# Patient Record
Sex: FEMALE | Race: WHITE | NOT HISPANIC OR LATINO | Employment: FULL TIME | URBAN - METROPOLITAN AREA
[De-identification: names, ages, dates, MRNs, and addresses within clinical notes are randomized per-mention and may not be internally consistent; named-entity substitution may affect disease eponyms.]

---

## 2017-01-05 ENCOUNTER — OFFICE VISIT (OUTPATIENT)
Dept: OPHTHALMOLOGY | Facility: CLINIC | Age: 57
End: 2017-01-05
Payer: COMMERCIAL

## 2017-01-05 DIAGNOSIS — H25.13 NUCLEAR SCLEROSIS, BILATERAL: ICD-10-CM

## 2017-01-05 DIAGNOSIS — H40.013 OPEN ANGLE WITH BORDERLINE FINDINGS, LOW RISK, BILATERAL: Primary | ICD-10-CM

## 2017-01-05 DIAGNOSIS — H52.7 REFRACTIVE ERROR: ICD-10-CM

## 2017-01-05 PROCEDURE — 92250 FUNDUS PHOTOGRAPHY W/I&R: CPT | Mod: S$GLB,,, | Performed by: OPHTHALMOLOGY

## 2017-01-05 PROCEDURE — 92014 COMPRE OPH EXAM EST PT 1/>: CPT | Mod: S$GLB,,, | Performed by: OPHTHALMOLOGY

## 2017-01-05 PROCEDURE — 99999 PR PBB SHADOW E&M-EST. PATIENT-LVL II: CPT | Mod: PBBFAC,,, | Performed by: OPHTHALMOLOGY

## 2017-01-05 PROCEDURE — 92083 EXTENDED VISUAL FIELD XM: CPT | Mod: S$GLB,,, | Performed by: OPHTHALMOLOGY

## 2017-01-05 NOTE — PROGRESS NOTES
SUBJECTIVE:   Danitza Santos is a 56 y.o. female   Corrected distance visual acuity was 20/25 -1 in the right eye and 20/30 +2 in the left eye.   Chief Complaint   Patient presents with    Glaucoma Suspect     1 year HVF/FD, no drops        HPI:  HPI     Glaucoma Suspect    Additional comments: 1 year HVF/FD, no drops           Comments   Pt states she's been noticing a slight decline in her vision when driving   at night. Just started recently, she doesn't drive much at night. Not   using any drops, no ocular pain or irritation. Distance vision is still   about the same, would like to update her glasses, she lost her last pair.    1. COAG Susp + Fhx  2. Blepharitis  3. Dry Eyes OU       Last edited by Rob Yu on 1/5/2017  9:01 AM. (History)        Assessment /Plan :  1. Open angle with borderline findings, low risk, bilateral No evidence of glaucoma at this time but based on risk factors recommend to continue monitoring.   2. Nuclear sclerosis, bilateral  - not visually significant. Observe.   3. Refractive error PAL Rx     Return to clinic in 1 year  or as needed.  With 24-2 HVF, Dilation and SDP's

## 2017-01-05 NOTE — MR AVS SNAPSHOT
[facility] - Ophthalmology  [address] [place] [address]  Phone: [phone]  Fax: [phone]                  Danitza Santos   2017 8:45 AM   Office Visit    Description:  Female : 1960   Provider:  Colin Estevez MD   Department:  [facility] - Ophthalmology           Reason for Visit     Glaucoma Suspect           Diagnoses this Visit        Comments    Open angle with borderline findings, low risk, bilateral    -  Primary     Nuclear sclerosis, bilateral         Refractive error                To Do List           Goals (5 Years of Data)     None      Follow-Up and Disposition     Return in about 1 year (around 2018).      [facility] On Call     [facility] On Call Nurse Care Line -  Assistance  Registered nurses in the [facility] On Call Center provide clinical advisement, health education, appointment booking, and other advisory services.  Call for this free service at [phone].             Medications           Message regarding Medications     Verify the changes and/or additions to your medication regime listed below are the same as discussed with your clinician today.  If any of these changes or additions are incorrect, please notify your healthcare provider.             Verify that the below list of medications is an accurate representation of the medications you are currently taking.  If none reported, the list may be blank. If incorrect, please contact your healthcare provider. Carry this list with you in case of emergency.           Current Medications     albuterol 90 mcg/actuation inhaler Inhale 1-2 puffs into the lungs every 6 (six) hours as needed for Wheezing.           Clinical Reference Information           Allergies as of 2017     No Known Allergies      Immunizations Administered on Date of Encounter - 2017     None      Orders Placed During Today's Visit      Normal Orders This Visit    Color Fundus Photography - OU - Both Eyes     [facility] Visual  Field - OU - Extended - Both Eyes

## 2017-01-19 ENCOUNTER — CLINICAL SUPPORT (OUTPATIENT)
Dept: SMOKING CESSATION | Facility: CLINIC | Age: 57
End: 2017-01-19
Payer: COMMERCIAL

## 2017-01-19 ENCOUNTER — TELEPHONE (OUTPATIENT)
Dept: SMOKING CESSATION | Facility: CLINIC | Age: 57
End: 2017-01-19

## 2017-01-19 DIAGNOSIS — F17.200 NICOTINE DEPENDENCE: Primary | ICD-10-CM

## 2017-01-19 PROCEDURE — 99407 BEHAV CHNG SMOKING > 10 MIN: CPT | Mod: S$GLB,,, | Performed by: GENERAL PRACTICE

## 2017-08-31 ENCOUNTER — CLINICAL SUPPORT (OUTPATIENT)
Dept: SMOKING CESSATION | Facility: CLINIC | Age: 57
End: 2017-08-31
Payer: COMMERCIAL

## 2017-08-31 DIAGNOSIS — F17.200 NICOTINE DEPENDENCE: Primary | ICD-10-CM

## 2017-08-31 PROCEDURE — 99407 BEHAV CHNG SMOKING > 10 MIN: CPT | Mod: S$GLB,,, | Performed by: INTERNAL MEDICINE

## 2017-09-13 ENCOUNTER — CLINICAL SUPPORT (OUTPATIENT)
Dept: SMOKING CESSATION | Facility: CLINIC | Age: 57
End: 2017-09-13
Payer: COMMERCIAL

## 2017-09-13 DIAGNOSIS — F17.200 NICOTINE DEPENDENCE: Primary | ICD-10-CM

## 2017-09-13 PROCEDURE — 99404 PREV MED CNSL INDIV APPRX 60: CPT | Mod: S$GLB,,,

## 2017-09-13 PROCEDURE — 99999 PR PBB SHADOW E&M-EST. PATIENT-LVL I: CPT | Mod: PBBFAC,,,

## 2017-09-13 RX ORDER — VARENICLINE TARTRATE 0.5 (11)-1
KIT ORAL
Qty: 1 PACKAGE | Refills: 0 | Status: SHIPPED | OUTPATIENT
Start: 2017-09-13 | End: 2017-11-14 | Stop reason: ALTCHOICE

## 2017-09-20 ENCOUNTER — CLINICAL SUPPORT (OUTPATIENT)
Dept: SMOKING CESSATION | Facility: CLINIC | Age: 57
End: 2017-09-20
Payer: COMMERCIAL

## 2017-09-20 DIAGNOSIS — F17.200 NICOTINE DEPENDENCE: Primary | ICD-10-CM

## 2017-09-20 PROCEDURE — 90853 GROUP PSYCHOTHERAPY: CPT | Mod: S$GLB,,,

## 2017-09-20 NOTE — Clinical Note
Patient states that she is smoking 10 cpd. Patient on Chantix starter pack with no negative side effects. Patient CO=17 ppm. Patient states that her last cigarette 1.5 hours ago.  The patient will continue with group therapy sessions and medication regimen prescribed with management by physician or Cessation Clinic Provider. Patient will inform Smoking Clinic Cessation Counselor of symptoms as rated high on TCRS.

## 2017-09-26 NOTE — PROGRESS NOTES
Site: [facility]  Date:  9/20/2017  Clinical Status of Patient: Outpatient   Length of Service and Code: 60 minutes - 09249   Number in Attendance: 2  Group Activities/Focus of Group:  Orientation, completion of TCRS (Tobacco Cessation Rating Scale) learned addiction model, cues/triggers, personal reasons for quitting, medications, goals, quit date.    Target symptoms:  withdrawal and medication side effects             The following were rated moderate (3) to severe (4) on TCRS:       Moderate 3: None     Severe 4:   None  Patient's Response to Intervention: Patient states that she is smoking 10 cpd. Patient on Chantix starter pack with no negative side effects. Patient CO=17 ppm. Patient states that her last cigarette 1.5 hours ago.   Progress Toward Goals and Other Mental Status Changes:  Patient denies any behavioral or mental status changes at this time.       Diagnosis: Z72.0  Plan: The patient will continue with group therapy sessions and medication regimen prescribed with management by physician or Cessation Clinic Provider. Patient will inform Smoking Clinic Cessation Counselor of symptoms as rated high on TCRS.    Return to Clinic: 1 week

## 2017-10-04 ENCOUNTER — CLINICAL SUPPORT (OUTPATIENT)
Dept: SMOKING CESSATION | Facility: CLINIC | Age: 57
End: 2017-10-04
Payer: COMMERCIAL

## 2017-10-04 DIAGNOSIS — F17.200 NICOTINE DEPENDENCE: Primary | ICD-10-CM

## 2017-10-04 PROCEDURE — 99999 PR PBB SHADOW E&M-EST. PATIENT-LVL I: CPT | Mod: PBBFAC,,,

## 2017-10-04 PROCEDURE — 90853 GROUP PSYCHOTHERAPY: CPT | Mod: S$GLB,,,

## 2017-10-04 NOTE — PROGRESS NOTES
Smoking Cessation Group Session #2    Site: [facility]  Date:  10/4/2017  Clinical Status of Patient: Outpatient   Length of Service and Code: 60 minutes - 92917   Number in Attendance: 2  Group Activities/Focus of Group: completion of TCRS (Tobacco Cessation Rating Scale) reviewed strategies, cues, and triggers. Introduced the negative impact of tobacco on health, the health advantages of discontinuing the use of tobacco, time line improved health changes after a quit, withdrawal issues to expect from nicotine and habit, and ways to achieve the goal of a quit.    Target symptoms:  withdrawal and medication side effects             The following were rated moderate (3) to severe (4) on TCRS:       Moderate 3: nausea, upset stomach; reviewed with patient     Severe 4:   none  Patient's Response to Intervention: Patient was smoking 10 cpd prior to starting program and is now down to smoking 5-6 cpd. Patient remains on prescribed tobacco cessation medication regimen of Chantix starter pack. Pt stated sinces he started the second week of Chantixs he has started having nausea and upset stomach. Advised pt to eat prior to taking pill, drink with a full glass of water, and if symptoms continue to please contact CTTS so we can cut back dose to 1 mg QD. Discussed rate reduction with her and encouraged her to wait 15 min prior to smoking, move location of where she keeps her cigarettes, and to stay distracted and develop new habits oppose to smoking. Pt's goal for the week is to cut back and attempt a dry run. Pt stated last Tues and Wed she didn't have a cigarette and went 2 days without one. Exhaled carbon monoxide level was 13 ppm per Smokerlyzer (0-6 non-smoker). Will see pt back in 2 weeks for group session.      Progress Toward Goals and Other Mental Status Changes: The patient denies any abnormal behavioral or mental changes at this time.     Diagnosis: Z72.0  Plan: The patient will continue with group therapy sessions and  medication regimen prescribed with management by physician or by the Cessation Clinic Provider. Patient will inform Smoking Cessation Counselor of symptoms as rated high on TCRS.    Return to Clinic: 2 weeks    Quit Date: TBD   Planned Quit Date: TBD

## 2017-10-18 ENCOUNTER — CLINICAL SUPPORT (OUTPATIENT)
Dept: SMOKING CESSATION | Facility: CLINIC | Age: 57
End: 2017-10-18
Payer: COMMERCIAL

## 2017-10-18 DIAGNOSIS — F17.200 NICOTINE DEPENDENCE: Primary | ICD-10-CM

## 2017-10-18 PROCEDURE — 99999 PR PBB SHADOW E&M-EST. PATIENT-LVL I: CPT | Mod: PBBFAC,,,

## 2017-10-18 PROCEDURE — 90853 GROUP PSYCHOTHERAPY: CPT | Mod: S$GLB,,,

## 2017-10-18 NOTE — Clinical Note
Patient was smoking 10 cpd prior to starting program and is now down to smoking 5-6 cpd. Patient remains on prescribed tobacco cessation medication regimen of Chantix starter pack. Discussed rate reduction with her and encouraged her to wait 90 min prior to smoking, move location of where she keeps her cigarettes, and to stay distracted and develop new habits oppose to smoking. Pt's goal for the week is to cut back and attempt a dry run. Exhaled carbon monoxide level was 11 ppm per Smokerlyzer (0-6 non-smoker). Will see pt back in 2 weeks for group session.

## 2017-10-18 NOTE — PROGRESS NOTES
Smoking Cessation Group Session #3    Site: [facility]  Date:  10/18/2017  Clinical Status of Patient: Outpatient   Length of Service and Code: 60 minutes - 22099   Number in Attendance: 2  Group Activities/Focus of Group:  completion of TCRS (Tobacco Cessation Rating Scale) reviewed strategies, controlling environment, cues, triggers, new goals set. Introduced high risk situations with preparation interventions, caffeine similarities with withdrawal issues of habit and nicotine, Alcohol, Understanding urges, cravings, stress and relaxation. Open discussion with intervention discussion.    Target symptoms:  withdrawal and medication side effects             The following were rated moderate (3) to severe (4) on TCRS:       Moderate 3: none     Severe 4:   none2  Patient's Response to Intervention: Patient was smoking 10 cpd prior to starting program and is now down to smoking 5-6 cpd. Patient remains on prescribed tobacco cessation medication regimen of Chantix starter pack. Discussed rate reduction with her and encouraged her to wait 90 min prior to smoking, move location of where she keeps her cigarettes, and to stay distracted and develop new habits oppose to smoking. Pt's goal for the week is to cut back and attempt a dry run. Exhaled carbon monoxide level was 11 ppm per Smokerlyzer (0-6 non-smoker). Will see pt back in 2 weeks for group session.     Progress Toward Goals and Other Mental Status Changes: The patient denies any abnormal behavioral or mental changes at this time.     Diagnosis: Z72.0  Plan: The patient will continue with group therapy sessions and medication regimen prescribed with management by physician or by the Cessation Clinic Provider. Patient will inform Smoking Cessation Counselor of symptoms as rated high on TCRS.    Return to Clinic: 2 weeks    Planned Quit Date: 10/25/17

## 2017-11-01 ENCOUNTER — CLINICAL SUPPORT (OUTPATIENT)
Dept: SMOKING CESSATION | Facility: CLINIC | Age: 57
End: 2017-11-01
Payer: COMMERCIAL

## 2017-11-01 DIAGNOSIS — F17.200 NICOTINE DEPENDENCE: Primary | ICD-10-CM

## 2017-11-01 PROCEDURE — 90853 GROUP PSYCHOTHERAPY: CPT | Mod: S$GLB,,,

## 2017-11-01 PROCEDURE — 99999 PR PBB SHADOW E&M-EST. PATIENT-LVL I: CPT | Mod: PBBFAC,,,

## 2017-11-01 NOTE — PROGRESS NOTES
Smoking Cessation Group Session #4    Site: [facility]  Date:  11/1/2017  Clinical Status of Patient: Outpatient   Length of Service and Code: 60 minutes - 47803   Number in Attendance: 2  Group Activities/Focus of Group: Completion of TCRS (Tobacco Cessation Rating Scale) reviewed strategies, habitual behavior, stress, and high risk situations. Introduced stress with addition interventions, SOLVE, relaxation with interventions, nutrition, exercise, weight gain, and the importance of rewarding oneself for accomplishments toward becoming tobacco free. Open discussion of all items with interventions.      Specific session focus: Problems, weight, stress    Target symptoms:  withdrawal and medication side effects             The following were rated moderate (3) to severe (4) on TCRS:       Moderate 3: None     Severe 4:   None  Patient's Response to Intervention: Patient states that she is smoking 5-6 cpd. Patient continues on 1 mg Chantix BID with no negative side effects. Patient came in today wearing a patch that she states came from a friend who had some left over. Patient stated that she did not know the strength was when asked. Explained to patient the importance of not using someone else's medication. Called patient once she arrived home to find out the strength of the patches. Patient stated that it was 21 mg. Advised patient to take the patch off because it was too much for her. Will follow up with patient in 2 weeks.     Progress Toward Goals and Other Mental Status Changes: Patient denies any behavioral or mental status changes.      Diagnosis: Z72.0  Plan: The patient will continue with group therapy sessions and medication regimen prescribed with management by physician or by the Cessation Clinic Provider. Patient will inform Smoking Cessation Counselor of symptoms as rated high on TCRS.    Return to Clinic: 2 weeks    Quit Date: TBD  Planned Quit Date: TBD

## 2017-11-01 NOTE — Clinical Note
Patient states that she is smoking 5-6 cpd. Patient continues on 1 mg Chantix BID with no negative side effects. Patient came in today wearing a patch that she states came from a friend who had some left over. Patient stated that she did not know the strength was when asked. Explained to patient the importance of not using someone else's medication. Called patient once she arrived home to find out the strength of the patches. Patient stated that it was 21 mg. Advised patient to take the patch off because it was too much for her. Will follow up with patient in 2 weeks. The patient will continue with group therapy sessions and medication regimen prescribed with management by physician or by the Cessation Clinic Provider. Patient will inform Smoking Cessation Counselor of symptoms as rated high on TCRS.

## 2017-11-14 ENCOUNTER — CLINICAL SUPPORT (OUTPATIENT)
Dept: SMOKING CESSATION | Facility: CLINIC | Age: 57
End: 2017-11-14
Payer: COMMERCIAL

## 2017-11-14 DIAGNOSIS — F17.210 LIGHT CIGARETTE SMOKER (1-9 CIGS/DAY): Primary | ICD-10-CM

## 2017-11-14 PROCEDURE — 99999 PR PBB SHADOW E&M-EST. PATIENT-LVL I: CPT | Mod: PBBFAC,,,

## 2017-11-14 PROCEDURE — 90853 GROUP PSYCHOTHERAPY: CPT | Mod: S$GLB,,,

## 2017-11-14 RX ORDER — IBUPROFEN 200 MG
1 TABLET ORAL DAILY
Qty: 30 PATCH | Refills: 0 | Status: SHIPPED | OUTPATIENT
Start: 2017-11-14

## 2017-11-14 NOTE — Clinical Note
The patient is smoking 6 cigarettes per day and the patient will begin the prescribed tobacco cessation medication regimen of 14 mg patches. Patient is no longer taking the Chantix due to running out and not able to refill but still smoking. The patient is not experiencing any negative side effects at this time with any medications but is having an issue with the habit of smoking more than the nicotine; this was discovered during our group session and interview period. Discussed and reviewed additional interventions to help the patient to understand the habit component of tobacco and over come believing it is all nicotine stopping her from becoming quit. CO 12 ppm and last cigarettes smoked was 90 minutes ago and discussed that this indicates that she does inhale deep (with the suggestion to try and sip not inhale) and holds for long periods of time (to pay attention to time holding her breath)

## 2017-11-14 NOTE — PROGRESS NOTES
Smoking Cessation Group Session #3    Site: [facility]  Date:  11/14/2017  Clinical Status of Patient: Outpatient   Length of Service and Code: 60 minutes - 83236   Number in Attendance: 2  Group Activities/Focus of Group:  completion of TCRS (Tobacco Cessation Rating Scale) reviewed strategies, controlling environment, cues, triggers, new goals set. Introduced high risk situations with preparation interventions, caffeine similarities with withdrawal issues of habit and nicotine, Alcohol, Understanding urges, cravings, stress and relaxation. Open discussion with intervention discussion.    Target symptoms:  withdrawal and medication side effects             The following were rated moderate (3) to severe (4) on TCRS:       Moderate 3: none     Severe 4:   none  Patient's Response to Intervention: The patient is smoking 6 cigarettes per day and the patient will begin the prescribed tobacco cessation medication regimen of 14 mg patches. Patient is no longer taking the Chantix due to running out and not able to refill but still smoking. The patient is not experiencing any negative side effects at this time with any medications but is having an issue with the habit of smoking more than the nicotine; this was discovered during our group session and interview period. Discussed and reviewed additional interventions to help the patient to understand the habit component of tobacco and over come believing it is all nicotine stopping her from becoming quit. CO 12 ppm and last cigarettes smoked was 90 minutes ago and discussed that this indicates that she does inhale deep (with the suggestion to try and sip not inhale) and holds for long periods of time (to pay attention to time holding her breath).   Progress Toward Goals and Other Mental Status Changes: The patient denies any abnormal behavioral or mental changes at this time.     Diagnosis: Z72.0  Plan: The patient will continue with group therapy sessions and medication  monitoring by CTTS. Prescribed medication management will be by physician.   Return to Clinic: 3 weeks    Quit Date:    Planned Quit Date:

## 2017-12-05 ENCOUNTER — CLINICAL SUPPORT (OUTPATIENT)
Dept: SMOKING CESSATION | Facility: CLINIC | Age: 57
End: 2017-12-05
Payer: COMMERCIAL

## 2017-12-05 DIAGNOSIS — F17.200 NICOTINE DEPENDENCE: Primary | ICD-10-CM

## 2017-12-05 PROCEDURE — 99999 PR PBB SHADOW E&M-EST. PATIENT-LVL I: CPT | Mod: PBBFAC,,,

## 2017-12-05 PROCEDURE — 90853 GROUP PSYCHOTHERAPY: CPT | Mod: S$GLB,,,

## 2017-12-05 NOTE — PROGRESS NOTES
Smoking Cessation Group Session #4    Site: [facility]    Date:  12/5/2017  Clinical Status of Patient: Outpatient   Length of Service and Code: 60 minutes - 10648   Number in Attendance: 2  Group Activities/Focus of Group: completion of TCRS (Tobacco Cessation Rating Scale) reviewed strategies, habitual behavior, stress, and high risk situations. Introduced stress with addition interventions, SOLVE, relaxation with interventions, nutrition, exercise, weight gain, and the importance of rewarding oneself for accomplishments toward becoming tobacco free. Open discussion of all items with interventions.     Target symptoms:  withdrawal and medication side effects             The following were rated moderate (3) to severe (4) on TCRS:       Moderate 3: None     Severe 4:   None  Patient's Response to Intervention: Patient has reduced her smoking to 5 cigarettes per day and will quit 12/06/2017. Patient remains on prescribed tobacco cessation medication regimen of 21 mg patch, without any negative side effects at this time. Co level measured at 14 with last cigarette smoked 1.5 hours prior to arrival.    Progress Toward Goals and Other Mental Status Changes: The patient denies any abnormal behavioral or mental changes at this time.       Diagnosis: Z72.0  Plan. The patient will continue with group therapy sessions and medication monitoring by CTTS. Prescribed medication management will be by physician.   Return to Clinic: 2 weeks    Quit Date:    Planned Quit Date: 12/06/2017

## 2017-12-05 NOTE — Clinical Note
Patient has reduced her smoking to 5 cigarettes per day and will quit 12/06/2017. Patient remains on prescribed tobacco cessation medication regimen of 21 mg patch, without any negative side effects at this time. Co level measured at 14 with last cigarette smoked 1.5 hours prior to arrival.

## 2017-12-19 ENCOUNTER — CLINICAL SUPPORT (OUTPATIENT)
Dept: SMOKING CESSATION | Facility: CLINIC | Age: 57
End: 2017-12-19
Payer: COMMERCIAL

## 2017-12-19 DIAGNOSIS — F17.200 NICOTINE DEPENDENCE: Primary | ICD-10-CM

## 2017-12-19 PROCEDURE — 99999 PR PBB SHADOW E&M-EST. PATIENT-LVL I: CPT | Mod: PBBFAC,,,

## 2017-12-19 PROCEDURE — 90853 GROUP PSYCHOTHERAPY: CPT | Mod: S$GLB,,, | Performed by: INTERNAL MEDICINE

## 2017-12-19 NOTE — PROGRESS NOTES
Smoking Cessation Group Session #5    Site: [facility]    Date:  12/19/2017  Clinical Status of Patient: Outpatient   Length of Service and Code: 60 minutes - 07348   Number in Attendance: 2  Group Activities/Focus of Group: completion of TCRS (Tobacco Cessation Rating Scale) reviewed strategies, habitual behavior, high risks situations, understanding urges and cravings, stress and relaxation with open discussion and additional interventions, Introduced lapses, relapses, understanding them and analyzing the situation of a lapse, conflict issues that may be linked to a lapse.         Target symptoms:  withdrawal and medication side effects             The following were rated moderate (3) to severe (4) on TCRS:       Moderate 3: None     Severe 4:   None  Patient's Response to Intervention: Patient is still smoking 5 cigarettes per day with a measured CO of 11 ppm. Last cigarette smoked at 2200 yesterday. Quit was discussed but not set. Patient is experiencing life stressors at this time. New strategies given to effectively cope with stress. The patient remains on the prescribed tobacco cessation medication regimen of 14 mg patch without any negative side effects at this time.      Progress Toward Goals and Other Mental Status Changes: The patient will continue with group therapy sessions and medication monitoring by CTTS. Prescribed medication management will be by physician.       Diagnosis: Z72.0  Plan: The patient will continue with group therapy sessions and medication monitoring by CTTS. Prescribed medication management will be by physician.   Return to Clinic: 2 weeks    Quit Date:    Planned Quit Date:

## 2018-01-02 ENCOUNTER — CLINICAL SUPPORT (OUTPATIENT)
Dept: SMOKING CESSATION | Facility: CLINIC | Age: 58
End: 2018-01-02
Payer: COMMERCIAL

## 2018-01-02 PROCEDURE — 99999 PR PBB SHADOW E&M-EST. PATIENT-LVL I: CPT | Mod: PBBFAC,,,

## 2018-01-02 PROCEDURE — 90853 GROUP PSYCHOTHERAPY: CPT | Mod: S$GLB,,, | Performed by: INTERNAL MEDICINE

## 2018-01-02 NOTE — PROGRESS NOTES
Smoking Cessation Group Session #6    Site: [facility]    Date:  1/2/2018  Clinical Status of Patient: Outpatient   Length of Service and Code: 60 minutes - 59200   Number in Attendance: 2  Group Activities/Focus of Group:  completion of TCRS (Tobacco Cessation Rating Scale) reviewed strategies, cues, triggers, high risk situations, lapses, relapses, diet, exercise, stress, relaxation, sleep, habitual behavior, and life style changes.      Target symptoms:  withdrawal and medication side effects             The following were rated moderate (3) to severe (4) on TCRS:       Moderate 3: none     Severe 4:   none  Patient's Response to Intervention: Patient increased  smoking to 6  cigarettes per day with a measured CO of 14  ppm. Last cigarette smoked 2 hours prior to visit . Quit was discussed but not set. Patient is experiencing life stressors at this time.  The patient remains on the prescribed tobacco cessation medication regimen of 14 mg patch without any negative side effects at this time. Discussed scheduling follow up sessions.       Progress Toward Goals and Other Mental Status Changes: The patient denies any abnormal behavioral or mental changes at this time.       Diagnosis: Z72.0  Plan: The patient will continue with group therapy sessions and medication monitoring by CTTS. Prescribed medication management will be by physician.   Return to Clinic:    Quit Date:    Planned Quit Date:

## 2018-01-05 ENCOUNTER — OFFICE VISIT (OUTPATIENT)
Dept: OPHTHALMOLOGY | Facility: CLINIC | Age: 58
End: 2018-01-05
Payer: COMMERCIAL

## 2018-01-05 DIAGNOSIS — H40.013 OPEN ANGLE WITH BORDERLINE FINDINGS, LOW RISK, BILATERAL: Primary | ICD-10-CM

## 2018-01-05 DIAGNOSIS — H25.13 NUCLEAR SCLEROSIS, BILATERAL: ICD-10-CM

## 2018-01-05 PROCEDURE — 99999 PR PBB SHADOW E&M-EST. PATIENT-LVL II: CPT | Mod: PBBFAC,,, | Performed by: OPHTHALMOLOGY

## 2018-01-05 PROCEDURE — 92083 EXTENDED VISUAL FIELD XM: CPT | Mod: S$GLB,,, | Performed by: OPHTHALMOLOGY

## 2018-01-05 PROCEDURE — 92250 FUNDUS PHOTOGRAPHY W/I&R: CPT | Mod: S$GLB,,, | Performed by: OPHTHALMOLOGY

## 2018-01-05 PROCEDURE — 92014 COMPRE OPH EXAM EST PT 1/>: CPT | Mod: S$GLB,,, | Performed by: OPHTHALMOLOGY

## 2018-01-05 NOTE — PROGRESS NOTES
SUBJECTIVE:   Danitza Santos is a 57 y.o. female   Corrected distance visual acuity was 20/20 in the right eye and 20/20 in the left eye.   Chief Complaint   Patient presents with    Glaucoma Suspect        HPI:  HPI     Pt here for annual HVF SDP chk. No pain or discomfort. VA stable. No   gtts.     1. COAG Susp + Fhx  2. Blepharitis  3. Dry Eyes OU    Last edited by Anthony Dangelo, Patient Care Assistant on 1/5/2018  8:24   AM. (History)        Assessment /Plan :  1. Open angle with borderline findings, low risk, bilateral No evidence of glaucoma at this time but based on risk factors recommend to continue monitoring.     2. Nuclear sclerosis, bilateral  - not visually significant. Observe.       Return to clinic in 1 year  or as needed.  With 24-2 HVF, Dilation and SDP's

## 2018-05-28 ENCOUNTER — CLINICAL SUPPORT (OUTPATIENT)
Dept: SMOKING CESSATION | Facility: CLINIC | Age: 58
End: 2018-05-28
Payer: COMMERCIAL

## 2018-05-28 DIAGNOSIS — F17.200 NICOTINE DEPENDENCE: Primary | ICD-10-CM

## 2018-05-28 PROCEDURE — 99407 BEHAV CHNG SMOKING > 10 MIN: CPT | Mod: S$GLB,,,

## 2018-05-28 NOTE — PROGRESS NOTES
Successful contact with patient regarding tobacco cessation quit #2. Pt reports, she remains tobacco free since March 2018 and no further assistance is needed at this time. Pt commended for her accomplishment, thus far.  Pt provided with her benefit status and contact information to schedule an appointment if needed.  Will follow up with her in 4 months.

## 2018-09-12 ENCOUNTER — TELEPHONE (OUTPATIENT)
Dept: SMOKING CESSATION | Facility: CLINIC | Age: 58
End: 2018-09-12

## 2018-10-09 ENCOUNTER — TELEPHONE (OUTPATIENT)
Dept: SMOKING CESSATION | Facility: CLINIC | Age: 58
End: 2018-10-09